# Patient Record
Sex: MALE | NOT HISPANIC OR LATINO | URBAN - METROPOLITAN AREA
[De-identification: names, ages, dates, MRNs, and addresses within clinical notes are randomized per-mention and may not be internally consistent; named-entity substitution may affect disease eponyms.]

---

## 2020-07-15 ENCOUNTER — IMPORTED ENCOUNTER (OUTPATIENT)
Dept: URBAN - METROPOLITAN AREA CLINIC 1 | Facility: CLINIC | Age: 45
End: 2020-07-15

## 2020-07-15 PROBLEM — H52.13: Noted: 2020-07-15

## 2020-07-15 PROBLEM — H52.4: Noted: 2020-07-15

## 2020-07-15 PROBLEM — H52.223: Noted: 2020-07-15

## 2020-07-15 PROCEDURE — S0620 ROUTINE OPHTHALMOLOGICAL EXA: HCPCS

## 2020-07-15 NOTE — PATIENT DISCUSSION
1. Myopia w/ Astigmatism -- Rx was given for correction if indicated and requested. 2. Presbyopia --Return for an appointment in 1 year for a 40 with Dr. Marcos Matthew.

## 2022-04-02 ASSESSMENT — TONOMETRY
OS_IOP_MMHG: 16
OD_IOP_MMHG: 18

## 2022-04-02 ASSESSMENT — VISUAL ACUITY
OS_CC: 20/20-2
OD_SC: J2
OS_SC: J2
OD_CC: 20/20-1